# Patient Record
Sex: MALE | Race: WHITE | NOT HISPANIC OR LATINO
[De-identification: names, ages, dates, MRNs, and addresses within clinical notes are randomized per-mention and may not be internally consistent; named-entity substitution may affect disease eponyms.]

---

## 2020-09-10 PROBLEM — Z00.00 ENCOUNTER FOR PREVENTIVE HEALTH EXAMINATION: Status: ACTIVE | Noted: 2020-09-10

## 2020-09-15 ENCOUNTER — APPOINTMENT (OUTPATIENT)
Dept: PULMONOLOGY | Facility: CLINIC | Age: 60
End: 2020-09-15

## 2023-02-13 ENCOUNTER — APPOINTMENT (OUTPATIENT)
Dept: OTOLARYNGOLOGY | Facility: CLINIC | Age: 63
End: 2023-02-13
Payer: COMMERCIAL

## 2023-02-13 VITALS
OXYGEN SATURATION: 98 % | DIASTOLIC BLOOD PRESSURE: 80 MMHG | SYSTOLIC BLOOD PRESSURE: 125 MMHG | RESPIRATION RATE: 13 BRPM | HEART RATE: 67 BPM | TEMPERATURE: 98 F

## 2023-02-13 DIAGNOSIS — D44.0 NEOPLASM OF UNCERTAIN BEHAVIOR OF THYROID GLAND: ICD-10-CM

## 2023-02-13 PROCEDURE — 76536 US EXAM OF HEAD AND NECK: CPT

## 2023-02-13 PROCEDURE — 99205 OFFICE O/P NEW HI 60 MIN: CPT | Mod: 25

## 2023-02-13 PROCEDURE — 31575 DIAGNOSTIC LARYNGOSCOPY: CPT

## 2023-02-13 NOTE — REASON FOR VISIT
SCDs [FreeTextEntry2] : a surgical consultation concerning thyroid nodules identified on a carotid Doppler study. [FreeTextEntry1] : Referred by Ruddy Allen MD, Subhash Waggoner MD PCP

## 2023-02-13 NOTE — PROCEDURE
[Image(s) Captured] : image(s) captured and filed [Unable to Cooperate with Mirror] : patient unable to cooperate with mirror [Gag Reflex] : gag reflex preventing mirror examination [Topical Lidocaine] : topical lidocaine [Oxymetazoline HCl] : oxymetazoline HCl [Flexible Endoscope] : examined with the flexible endoscope [Serial Number: ___] : Serial Number: [unfilled] [FreeTextEntry3] : [facility]\par THYROID/NECK ULTRASOUND REPORT\par \par NAME: KATHY EUCEDA.Sahil.           MR#  [id number] ....	              : 1960.....	         DATE: 2023\par \par HISTORY/ INDICATIONS: A 62-year-old male found incidentally to have thyroid nodules on a carotid Doppler ultrasound.  \par \par COMPARISON: None.\par \par PROCEDURE: Physician performed high-resolution ultrasound gray scale imaging and color Doppler supplementation of the thyroid gland and neck was obtained in the longitudinal and transverse planes using a 13 MHz linear transducer with image capture.  All measurements are in centimeters (longitudinal x AP x transverse).  \par \par FINDINGS: Overall the thyroid gland is normal in size, heterogeneous in echotexture with normal vascularity on color Doppler flow.  There are 2 solid nodules 1 in the right mid posterior lobe and another in the right isthmus that meet criteria for FNA biopsies.  There are no enlarged or morphologically abnormal appearing cervical lymph nodes.\par \par RIGHT LOBE: Is not enlarged, heterogeneous, with normal vascularity on color Doppler and measures 4.40 x 1.44 x 2.23 cm.  NODULES: Within the right mid posterior lobe, a hypoechoic, heterogeneous, well-marginated, solid nodule with grade 2 vascularity is identified that is wider than tall and measures 1.41 x 0.55 x 0.96 cm.  There are echogenic foci present to suggest microcalcifications. The nodules are TIRADS TR-5 and TR-4 respectively.\par ISTHMUS: Measures 0.31 cm in AP dimension and is heterogeneous in echotexture with normal vascularity.  A right isthmus nodule is identified that is mildly hypoechoic, heterogeneous, is wider than tall without microcalcifications and grade 2 vascularity that measures 0.89 x 0.41 x 1.21 cm.\par \par LEFT LOBE: Is not enlarged, heterogeneous, with normal vascularity on color Doppler and measures 4.15 x 1.37 x 1.78 cm. NODULES: Within the left mid lobe, a hypoechoic, heterogeneous, well-marginated, nodule with grade 1 vascularity is identified and measures 0.61 x 0.26 x 0.38 cm. No echogenic foci are present to suggest microcalcifications this may represent a simple colloid cyst..\par \par PARATHYROID GLANDS: There are no identified enlarged parathyroid glands in the central neck compartment. \par \par LYMPH NODES: Bilateral neck levels I - VI were examined.  There are several benign appearing subcentimeter lymph nodes identified at neck levels II- III bilaterally (lateral neck), all with echogenic hilar lines, no calcifications or cystic degeneration and have a short long axis ratio < 0.5 in the transverse plane.  There are no enlarged or abnormal appearing central compartment, level VI lymph nodes.\par \par IMPRESSION: A 62-year-old male with incidental finding of thyroid nodules.  There are solid nodules in the right lobe and right isthmus that meet criteria to undergo FNA biopsy for further characterization.\par \par RECOMMENDATIONS: Consider FNA biopsies for the right mid posterior lobe solid, hypoechoic nodule and the right isthmus nodule.\par \par Electronically signed by referring, interpreting and reporting physician Galileo Palomino MD on 2023, 4:10 PM.\par \par [facility] [address], [address] [place] [address]\par [phone] (voice), [phone] (fax) [de-identified] : The nasal septum is minimally deviated to the right with a spur posterior on the left. There are no masses or polyps and the nasal mucosa and secretions are normal. The choanae and posterior nasopharynx are normal without masses or drainage. The Eustachian tube orifices appear patent. The pharynx, including the posterior and lateral pharyngeal walls, the vallecula and base of tongue are normal without ulcerations, lesions or masses. The hypopharynx including the pyriform sinuses open well without pooling of secretions, mucosal lesions or masses. The supraglottic larynx including the epiglottis, petiole, arytenoids, glossoepiglottic, aryepiglottic and pharyngoepiglottic folds are normal without mucosal lesions, ulcerations or masses. The glottis reveals normal false vocal folds. The true vocal folds are glistening white, tense and of equal length, without paralysis, having symmetric mobility on adduction and abduction. There are no mucosal lesions, nodules, cysts, erythroplasia or leukoplakia. The posterior cricoid area has healthy pink mucosa in the interarytenoid area and esophageal inlet. There is moderate thickening/pachydermia of the interarytenoid mucosa suggestive of posterior laryngitis from laryngopharyngeal acid reflux disease. The trachea is clear without narrowing in the immediate subglottic region, without deviation or lesions.  [de-identified] : thyroid nodules

## 2023-02-13 NOTE — CONSULT LETTER
[Dear  ___] : Dear  [unfilled], [Consult Letter:] : I had the pleasure of evaluating your patient, [unfilled]. [Please see my note below.] : Please see my note below. [Consult Closing:] : Thank you very much for allowing me to participate in the care of this patient.  If you have any questions, please do not hesitate to contact me. [Sincerely,] : Sincerely, [FreeTextEntry3] : \par Galileo Palomino M.D., FACS, ECNU\par Director Center for Thyroid [facility] Parathyroid Surgery at [facility]\par [facility] [place]\par Certified in Thyroid/Parathyroid/Neck Ultrasound, ECNU/ AIUM\par , Department of Otolaryngology\par [facility] [employer] [facility]\par

## 2023-02-13 NOTE — HISTORY OF PRESENT ILLNESS
[de-identified] : Darren is a only healthy 62-year-old male basil who was noted on a routine carotid Doppler study of the neck to have possible thyroid nodules and referred for further evaluation.  He is euthyroid. Darren denies recent shortness of breath, voice changes, dysphagia, anterior neck pain, neck pressure or mass. There is no family history of thyroid cancer. He denies any known radiation exposures in his youth.  However, he was a  during the [employer] collapse. He denies fever, body aches, cough, cyanosis, chest burning, anosmia or recent known COVID exposures.  All family members at home are well.  He is vaccinated and boosted.

## 2024-06-27 ENCOUNTER — APPOINTMENT (OUTPATIENT)
Dept: OTOLARYNGOLOGY | Facility: CLINIC | Age: 64
End: 2024-06-27
Payer: COMMERCIAL

## 2024-06-27 VITALS
BODY MASS INDEX: 25.48 KG/M2 | HEIGHT: 70 IN | WEIGHT: 178 LBS | DIASTOLIC BLOOD PRESSURE: 64 MMHG | TEMPERATURE: 97.3 F | SYSTOLIC BLOOD PRESSURE: 112 MMHG | OXYGEN SATURATION: 96 % | HEART RATE: 87 BPM

## 2024-06-27 DIAGNOSIS — R13.19 OTHER DYSPHAGIA: ICD-10-CM

## 2024-06-27 DIAGNOSIS — E04.2 NONTOXIC MULTINODULAR GOITER: ICD-10-CM

## 2024-06-27 DIAGNOSIS — R09.A2 FOREIGN BODY SENSATION, THROAT: ICD-10-CM

## 2024-06-27 DIAGNOSIS — K21.9 GASTRO-ESOPHAGEAL REFLUX DISEASE W/OUT ESOPHAGITIS: ICD-10-CM

## 2024-06-27 PROCEDURE — 31575 DIAGNOSTIC LARYNGOSCOPY: CPT

## 2024-06-27 PROCEDURE — 99215 OFFICE O/P EST HI 40 MIN: CPT | Mod: 25

## 2024-07-05 ENCOUNTER — APPOINTMENT (OUTPATIENT)
Dept: RADIOLOGY | Facility: HOSPITAL | Age: 64
End: 2024-07-05

## 2024-07-05 ENCOUNTER — APPOINTMENT (OUTPATIENT)
Dept: CT IMAGING | Facility: HOSPITAL | Age: 64
End: 2024-07-05

## 2024-07-05 ENCOUNTER — OUTPATIENT (OUTPATIENT)
Dept: OUTPATIENT SERVICES | Facility: HOSPITAL | Age: 64
LOS: 1 days | End: 2024-07-05
Payer: COMMERCIAL

## 2024-07-05 PROCEDURE — 70491 CT SOFT TISSUE NECK W/DYE: CPT

## 2024-07-05 PROCEDURE — 74220 X-RAY XM ESOPHAGUS 1CNTRST: CPT | Mod: 26

## 2024-07-05 PROCEDURE — 74220 X-RAY XM ESOPHAGUS 1CNTRST: CPT

## 2024-07-05 PROCEDURE — 70491 CT SOFT TISSUE NECK W/DYE: CPT | Mod: 26

## 2024-07-18 DIAGNOSIS — R47.02 DYSPHASIA: ICD-10-CM

## 2024-07-18 DIAGNOSIS — K22.4 DYSKINESIA OF ESOPHAGUS: ICD-10-CM

## 2024-07-18 DIAGNOSIS — K21.9 GASTRO-ESOPHAGEAL REFLUX DISEASE WITHOUT ESOPHAGITIS: ICD-10-CM

## 2024-07-18 DIAGNOSIS — R13.10 DYSPHAGIA, UNSPECIFIED: ICD-10-CM

## 2024-07-18 DIAGNOSIS — M47.812 SPONDYLOSIS WITHOUT MYELOPATHY OR RADICULOPATHY, CERVICAL REGION: ICD-10-CM

## 2024-07-18 DIAGNOSIS — E04.2 NONTOXIC MULTINODULAR GOITER: ICD-10-CM

## 2024-07-25 ENCOUNTER — APPOINTMENT (OUTPATIENT)
Dept: OTOLARYNGOLOGY | Facility: CLINIC | Age: 64
End: 2024-07-25
Payer: COMMERCIAL

## 2024-07-25 VITALS
DIASTOLIC BLOOD PRESSURE: 68 MMHG | OXYGEN SATURATION: 96 % | HEIGHT: 70 IN | SYSTOLIC BLOOD PRESSURE: 112 MMHG | BODY MASS INDEX: 25.48 KG/M2 | TEMPERATURE: 98 F | HEART RATE: 67 BPM | WEIGHT: 178 LBS

## 2024-07-25 DIAGNOSIS — K21.9 GASTRO-ESOPHAGEAL REFLUX DISEASE W/OUT ESOPHAGITIS: ICD-10-CM

## 2024-07-25 DIAGNOSIS — E04.2 NONTOXIC MULTINODULAR GOITER: ICD-10-CM

## 2024-07-25 DIAGNOSIS — R13.19 OTHER DYSPHAGIA: ICD-10-CM

## 2024-07-25 DIAGNOSIS — R09.A2 FOREIGN BODY SENSATION, THROAT: ICD-10-CM

## 2024-07-25 DIAGNOSIS — D44.0 NEOPLASM OF UNCERTAIN BEHAVIOR OF THYROID GLAND: ICD-10-CM

## 2024-07-25 PROCEDURE — 31575 DIAGNOSTIC LARYNGOSCOPY: CPT

## 2024-07-25 PROCEDURE — 99215 OFFICE O/P EST HI 40 MIN: CPT | Mod: 25

## 2024-07-25 NOTE — HISTORY OF PRESENT ILLNESS
[de-identified] : Darren is a only healthy 62-year-old male basil who was noted on a routine carotid Doppler study of the neck to have possible thyroid nodules and referred for further evaluation.  He is euthyroid. Darren denies recent shortness of breath, voice changes, dysphagia, anterior neck pain, neck pressure or mass. There is no family history of thyroid cancer. He denies any known radiation exposures in his youth.  However, he was a  during the [employer] collapse. He denies fever, body aches, cough, cyanosis, chest burning, anosmia or recent known COVID exposures.  All family members at home are well.  He is vaccinated and boosted. -------------------------------------------------------------------------------------------------------------------------------------------------------------------------------------  [FreeTextEntry1] : Darren is a 64-year-old male who had undergone FNA biopsies for 2 nodules in his thyroid gland 1 in the right midpole and one in the isthmus both reported as benign, [place] category II.  His thyroid gland is not enlarged.  He is euthyroid.  He has not had a follow-up ultrasound since his last visit.  He complains of certain food getting trapped in his throat when swallowing over the past several months.  He often has to drink more fluids actually regurgitate the food as it does not pass.  He denies any new palpable mass in his neck, pain, or trouble breathing.  He has noticed that the tone of his voice changes changed in the morning hours over the past several months.  He does have chronic GERD and medicates with over-the-counter antacids (Rolaids, TUMS, Gaviscon and Pepto).  He denies nocturnal arousals from acid reflux.  He has lost weight voluntarily with changes in his diet and exercise over the past 8 months (~ 10 lbs).  His weight is now stable. He denies fever, body aches, cough, cyanosis, chest burning, anosmia or recent known COVID exposures.  All family members at home are well. He is vaccinated and boosted.  A neck CT scan was obtained, and this did not reveal any significant pathology other than the small nodules in his thyroid gland.  There is no evidence of compression against the esophagus.  A barium swallow also showed good flow of barium into the stomach with possible mild esophageal dysmotility.  His last EGD was in 2019 and he had several gastric polyps removed that were benign.  He now states that he has an intermittent tremor involving the right hand over the past 1 year and has not yet been evaluated by a Neurologist for possible PD as this may cause mild dysphagia.

## 2024-07-25 NOTE — PROCEDURE
[Image(s) Captured] : image(s) captured and filed [Unable to Cooperate with Mirror] : patient unable to cooperate with mirror [Gag Reflex] : gag reflex preventing mirror examination [Hoarseness] : hoarseness not clearly evaluated by indirect laryngoscopy [Topical Lidocaine] : topical lidocaine [Oxymetazoline HCl] : oxymetazoline HCl [Flexible Endoscope] : examined with the flexible endoscope [Serial Number: ___] : Serial Number: [unfilled] [Globus] : globus [de-identified] : Verbal informed consent was obtained for fiber optic laryngoscopy.  Findings: The nasal septum is minimally deviated to the right with a spur posterior on the left. There are no masses or polyps and the nasal mucosa and secretions are normal. The choanae and posterior nasopharynx are normal without masses or drainage. The Eustachian tube orifices appear patent. The pharynx, including the posterior and lateral pharyngeal walls, the vallecula and base of tongue are normal without ulcerations, lesions or masses. The hypopharynx including the pyriform sinuses open well without pooling of secretions, mucosal lesions or masses. The supraglottic larynx including the epiglottis, petiole, arytenoids, glossoepiglottic, aryepiglottic and pharyngoepiglottic folds are normal without mucosal lesions, ulcerations or masses. The glottis reveals normal false vocal folds. The true vocal folds are glistening white, tense and of equal length, without paralysis, having symmetric mobility on adduction and abduction. There are no mucosal lesions, nodules, cysts, erythroplasia or leukoplakia. The posterior cricoid area has healthy pink mucosa in the interarytenoid area and esophageal inlet. There is severe thickening/pachydermia and tiger stripping of the interarytenoid mucosa suggestive of posterior laryngitis from laryngopharyngeal acid reflux disease. The trachea is clear without narrowing in the immediate subglottic region, without deviation or lesions. -------------------------------------------------------------------------------------------------------------------------------------------------------------------------------------  [de-identified] : thyroid nodules, mild dysphagia and LPR

## 2024-07-25 NOTE — HISTORY OF PRESENT ILLNESS
[de-identified] : Darren is a only healthy 62-year-old male basil who was noted on a routine carotid Doppler study of the neck to have possible thyroid nodules and referred for further evaluation.  He is euthyroid. Darren denies recent shortness of breath, voice changes, dysphagia, anterior neck pain, neck pressure or mass. There is no family history of thyroid cancer. He denies any known radiation exposures in his youth.  However, he was a  during the [employer] collapse. He denies fever, body aches, cough, cyanosis, chest burning, anosmia or recent known COVID exposures.  All family members at home are well.  He is vaccinated and boosted. -------------------------------------------------------------------------------------------------------------------------------------------------------------------------------------  [FreeTextEntry1] : Darren is a 64-year-old male who had undergone FNA biopsies for 2 nodules in his thyroid gland 1 in the right midpole and one in the isthmus both reported as benign, [place] category II.  His thyroid gland is not enlarged.  He is euthyroid.  He has not had a follow-up ultrasound since his last visit.  He complains of certain food getting trapped in his throat when swallowing over the past several months.  He often has to drink more fluids actually regurgitate the food as it does not pass.  He denies any new palpable mass in his neck, pain, or trouble breathing.  He has noticed that the tone of his voice changes changed in the morning hours over the past several months.  He does have chronic GERD and medicates with over-the-counter antacids (Rolaids, TUMS, Gaviscon and Pepto).  He denies nocturnal arousals from acid reflux.  He has lost weight voluntarily with changes in his diet and exercise over the past 8 months (~ 10 lbs).  His weight is now stable. He denies fever, body aches, cough, cyanosis, chest burning, anosmia or recent known COVID exposures.  All family members at home are well. He is vaccinated and boosted.  A neck CT scan was obtained, and this did not reveal any significant pathology other than the small nodules in his thyroid gland.  There is no evidence of compression against the esophagus.  A barium swallow also showed good flow of barium into the stomach with possible mild esophageal dysmotility.  His last EGD was in 2019 and he had several gastric polyps removed that were benign.  He now states that he has an intermittent tremor involving the right hand over the past 1 year and has not yet been evaluated by a Neurologist for possible PD as this may cause mild dysphagia.

## 2024-07-25 NOTE — PROCEDURE
[Image(s) Captured] : image(s) captured and filed [Unable to Cooperate with Mirror] : patient unable to cooperate with mirror [Gag Reflex] : gag reflex preventing mirror examination [Hoarseness] : hoarseness not clearly evaluated by indirect laryngoscopy [Topical Lidocaine] : topical lidocaine [Oxymetazoline HCl] : oxymetazoline HCl [Flexible Endoscope] : examined with the flexible endoscope [Serial Number: ___] : Serial Number: [unfilled] [Globus] : globus [de-identified] : Verbal informed consent was obtained for fiber optic laryngoscopy.  Findings: The nasal septum is minimally deviated to the right with a spur posterior on the left. There are no masses or polyps and the nasal mucosa and secretions are normal. The choanae and posterior nasopharynx are normal without masses or drainage. The Eustachian tube orifices appear patent. The pharynx, including the posterior and lateral pharyngeal walls, the vallecula and base of tongue are normal without ulcerations, lesions or masses. The hypopharynx including the pyriform sinuses open well without pooling of secretions, mucosal lesions or masses. The supraglottic larynx including the epiglottis, petiole, arytenoids, glossoepiglottic, aryepiglottic and pharyngoepiglottic folds are normal without mucosal lesions, ulcerations or masses. The glottis reveals normal false vocal folds. The true vocal folds are glistening white, tense and of equal length, without paralysis, having symmetric mobility on adduction and abduction. There are no mucosal lesions, nodules, cysts, erythroplasia or leukoplakia. The posterior cricoid area has healthy pink mucosa in the interarytenoid area and esophageal inlet. There is severe thickening/pachydermia and tiger stripping of the interarytenoid mucosa suggestive of posterior laryngitis from laryngopharyngeal acid reflux disease. The trachea is clear without narrowing in the immediate subglottic region, without deviation or lesions. -------------------------------------------------------------------------------------------------------------------------------------------------------------------------------------  [de-identified] : thyroid nodules, mild dysphagia and LPR

## 2024-07-25 NOTE — CONSULT LETTER
[Dear  ___] : Dear  [unfilled], [Consult Letter:] : I had the pleasure of evaluating your patient, [unfilled]. [Please see my note below.] : Please see my note below. [Consult Closing:] : Thank you very much for allowing me to participate in the care of this patient.  If you have any questions, please do not hesitate to contact me. [Sincerely,] : Sincerely, [FreeTextEntry3] : \par  Galileo Palomino M.D., FACS, ECNU\par  Director Center for Thyroid & Parathyroid Surgery at [facility]\par  [facility] [place]\par  Certified in Thyroid/Parathyroid/Neck Ultrasound, ECNU/ AIUM\par  , Department of Otolaryngology\par  [facility] [employer] [facility] at [facility]\par

## 2024-07-25 NOTE — CONSULT LETTER
[Dear  ___] : Dear  [unfilled], [Consult Letter:] : I had the pleasure of evaluating your patient, [unfilled]. [Please see my note below.] : Please see my note below. [Consult Closing:] : Thank you very much for allowing me to participate in the care of this patient.  If you have any questions, please do not hesitate to contact me. [Sincerely,] : Sincerely, [FreeTextEntry3] : \par  Galileo Palomino M.D., FACS, ECNU\par  Director Center for Thyroid & Parathyroid Surgery at [facility]\par  [facility] [place]\par  Certified in Thyroid/Parathyroid/Neck Ultrasound, ECNU/ AIUM\par  , Department of Otolaryngology\par  [facility] at [facility]\par

## 2024-07-25 NOTE — REASON FOR VISIT
[FreeTextEntry2] : a f/u surgical consultation concerning thyroid nodules identified on a carotid Doppler study and LPR.  [FreeTextEntry1] : Referred by Ruddy Allen MD, Subhash Waggoner MD PCP

## 2024-07-25 NOTE — DATA REVIEWED
[de-identified] : see HPI  [de-identified] : Barium swallow and neck CT reviewed. [de-identified] : see HPI  [de-identified] : cytology report reviewed.

## 2024-07-25 NOTE — DATA REVIEWED
[de-identified] : see HPI  [de-identified] : see HPI  [de-identified] : Barium swallow and neck CT reviewed. [de-identified] : cytology report reviewed.

## 2024-08-27 ENCOUNTER — APPOINTMENT (OUTPATIENT)
Dept: GASTROENTEROLOGY | Facility: CLINIC | Age: 64
End: 2024-08-27